# Patient Record
Sex: MALE | Race: WHITE | URBAN - METROPOLITAN AREA
[De-identification: names, ages, dates, MRNs, and addresses within clinical notes are randomized per-mention and may not be internally consistent; named-entity substitution may affect disease eponyms.]

---

## 2019-03-22 ENCOUNTER — TELEPHONE (OUTPATIENT)
Dept: TRANSPLANT | Facility: CLINIC | Age: 64
End: 2019-03-22

## 2019-03-22 DIAGNOSIS — Z00.5 TRANSPLANT DONOR EVALUATION: Primary | ICD-10-CM

## 2019-03-22 NOTE — TELEPHONE ENCOUNTER
"[facility] B[facility]ZE  [id number]      LIVING KIDNEY DONOR EVALUATION  Donor First Name Andrew Donor MRN    Donor Last Name Sarita Completed 3/17/2019 6:37 PM    1955 Record ID [id number]   BREEZE Screen PASSED     Intended Recipient  Recipient First Name Janes Recipient MRN    Recipient Last Name Vicky Relationship Cousin   Recipient  1991 Recipient Diagnosis    Recipient's ABO      Donor Information  Age 63 Gender Male   Ht 178 cm (5' 10'') Race    Wt 90.7 kg (200 lbs) Ethnicity Not /   BMI 28.70 kg/m  Preferred Language English      Required No     Blood Type O   Demographics  Home Address [address] [place] [address] [address] # [phone]   [facility] Type [place]   State [place] Alternate #    Zip Code [id number] Type    Country [place] Preferred Contact day Thur   Email [email or web address] Preferred Contact time 11:00 AM-1:00 PM   &&   Donor's Medical Information  Medical History Allergic Rhinitis   Erectile Dysfunction   Urge Incontinence Medications Claritin   DHA Algal 900 (herbal supplement)   Daily vitamin for men   Flonase Nasal [place]   Surgical History Cataract with IOL   Refractive Surgery, NOS   Repair, Detached Retina Allergies NKDA   Social History EtOH: Daily (1-2 drinks/day)   Illicit Drug Use: Denies   Tobacco: Denies Self-Reported Functional Status \"I am able to climb 2 flights of stairs without stopping\"   Family Medical History Cancer (Father)   Diabetes (Father)   Heart Disease (denies)   Hypertension (Sibling)   Kidney Disease (denies)   Kidney Stones (denies) Exercise Frequency Exercise (>3X per week)   Review of Organ Systems  Review of Systems Airway or Lungs: No   Blood Disorder: No   Cancer: No   Diabetes,Thyroid,Adrenal,Endocrine Disorder: No   Digestive or Liver: No   Heart or Circulatory System: No   Immune Diseases: No   Kidneys and Bladder: Yes   Male Health: Yes   Muscles,Bones,Joints: No   Neuro: No   Psych: No   && "   Donor's Social Information  Marital Status  Living Accommodation Owns own home/apartment   Level of Education College or baccalaureate degree complete Living Arrangement With spouse   Employment Status Full Time Concerns: health and life insurance Yes   Employer [employer] Concerns: job security and lost income Yes   Occupation      Medical Insurance Status Has medical insurance     High Risk Behavior  High Risk Behaviors Blood transfusion < 12 months. (NO)   Commercial sex < 12 months. (NO)   Illicit IV drug use < 5yrs. (NO)   Male:male sexual contact < 5yrs. (NO)   Other high risk sexual contact < 12 months. (NO)   Reason for Donation  Referral Friend or Family of [place] Candidate Reason for Donation There's an unmet need.   Permission to Disclose Inquiry No Patient Comments    Donor Motivation Level Unsure, has questions     PCP Contact  PCP Name    PCP [facility]    PCP State    PCP Phone    Emergency Contact  First Name Rema First Name Jay   Last Name Sarita Last Name Sarita   Phone # [phone] Phone # [phone]   Phone Type Mobile Phone Type Mobile   Relationship Spouse Relationship Child   Office Use  Reviewed By    Reviewed 3/22/2019 8:39 AM   Admin Folder Archive   Comments 3/18/2019 eval passed   3/22/2019 archived   Lost for Followup    Extended Comments    [facility]ZE ID [email or web address]:[id number] survey status completed   Activity History  Call  Task    Due Date 3/21/2019   Last Modified Date/Time 3/21/2019 12:35 PM   Comments